# Patient Record
Sex: MALE | Race: WHITE
[De-identification: names, ages, dates, MRNs, and addresses within clinical notes are randomized per-mention and may not be internally consistent; named-entity substitution may affect disease eponyms.]

---

## 2018-09-24 ENCOUNTER — HOSPITAL ENCOUNTER (INPATIENT)
Dept: HOSPITAL 31 - C.ER | Age: 53
LOS: 1 days | Discharge: HOME | DRG: 578 | End: 2018-09-25
Payer: COMMERCIAL

## 2018-09-24 VITALS — RESPIRATION RATE: 20 BRPM

## 2018-09-24 DIAGNOSIS — I83.93: ICD-10-CM

## 2018-09-24 DIAGNOSIS — Z80.42: ICD-10-CM

## 2018-09-24 DIAGNOSIS — D18.09: ICD-10-CM

## 2018-09-24 DIAGNOSIS — D17.79: Primary | ICD-10-CM

## 2018-09-24 LAB
ALBUMIN SERPL-MCNC: 4.7 G/DL (ref 3.5–5)
ALBUMIN/GLOB SERPL: 1.2 {RATIO} (ref 1–2.1)
ALT SERPL-CCNC: 52 U/L (ref 21–72)
APTT BLD: 28 SECONDS (ref 21–34)
AST SERPL-CCNC: 47 U/L (ref 17–59)
BACTERIA #/AREA URNS HPF: (no result) /[HPF]
BASOPHILS # BLD AUTO: 0 K/UL (ref 0–0.2)
BASOPHILS NFR BLD: 0.6 % (ref 0–2)
BILIRUB UR-MCNC: NEGATIVE MG/DL
BUN SERPL-MCNC: 14 MG/DL (ref 9–20)
CALCIUM SERPL-MCNC: 9.1 MG/DL (ref 8.6–10.4)
EOSINOPHIL # BLD AUTO: 0.1 K/UL (ref 0–0.7)
EOSINOPHIL NFR BLD: 2.2 % (ref 0–4)
ERYTHROCYTE [DISTWIDTH] IN BLOOD BY AUTOMATED COUNT: 12.7 % (ref 11.5–14.5)
GFR NON-AFRICAN AMERICAN: > 60
GLUCOSE UR STRIP-MCNC: NORMAL MG/DL
HGB BLD-MCNC: 14.6 G/DL (ref 12–18)
INR PPP: 1.1
LEUKOCYTE ESTERASE UR-ACNC: (no result) LEU/UL
LYMPHOCYTES # BLD AUTO: 1.9 K/UL (ref 1–4.3)
LYMPHOCYTES NFR BLD AUTO: 28.8 % (ref 20–40)
MCH RBC QN AUTO: 30.1 PG (ref 27–31)
MCHC RBC AUTO-ENTMCNC: 34.2 G/DL (ref 33–37)
MCV RBC AUTO: 87.9 FL (ref 80–94)
MONOCYTES # BLD: 0.6 K/UL (ref 0–0.8)
MONOCYTES NFR BLD: 9 % (ref 0–10)
NEUTROPHILS # BLD: 3.9 K/UL (ref 1.8–7)
NEUTROPHILS NFR BLD AUTO: 59.4 % (ref 50–75)
NRBC BLD AUTO-RTO: 0.1 % (ref 0–2)
PH UR STRIP: 5 [PH] (ref 5–8)
PLATELET # BLD: 213 K/UL (ref 130–400)
PMV BLD AUTO: 10.9 FL (ref 7.2–11.7)
PROT UR STRIP-MCNC: NEGATIVE MG/DL
PROTHROMBIN TIME: 12.1 SECONDS (ref 9.7–12.2)
RBC # BLD AUTO: 4.85 MIL/UL (ref 4.4–5.9)
RBC # UR STRIP: NEGATIVE /UL
SP GR UR STRIP: 1.02 (ref 1–1.03)
SQUAMOUS EPITHIAL: 8 /HPF (ref 0–5)
UROBILINOGEN UR-MCNC: NORMAL MG/DL (ref 0.2–1)
WBC # BLD AUTO: 6.5 K/UL (ref 4.8–10.8)

## 2018-09-24 PROCEDURE — 0HXHXZZ TRANSFER RIGHT UPPER LEG SKIN, EXTERNAL APPROACH: ICD-10-PCS

## 2018-09-24 PROCEDURE — 0JBL0ZZ EXCISION OF RIGHT UPPER LEG SUBCUTANEOUS TISSUE AND FASCIA, OPEN APPROACH: ICD-10-PCS

## 2018-09-24 PROCEDURE — 0HXJXZZ TRANSFER LEFT UPPER LEG SKIN, EXTERNAL APPROACH: ICD-10-PCS

## 2018-09-24 PROCEDURE — 0JBM0ZZ EXCISION OF LEFT UPPER LEG SUBCUTANEOUS TISSUE AND FASCIA, OPEN APPROACH: ICD-10-PCS

## 2018-09-24 RX ADMIN — CEFAZOLIN SODIUM SCH MLS/HR: 1 SOLUTION INTRAVENOUS at 21:10

## 2018-09-24 RX ADMIN — CEFAZOLIN SODIUM SCH MLS: 1 SOLUTION INTRAVENOUS at 19:30

## 2018-09-24 NOTE — C.PDOC
History Of Present Illness


54 y/o male presents to ED with c/o pain to veins b/l thighs. PT notes that he 

had this for many years but pain worsened recently. Patient denies trauma, 

discharge, fever, redness, change in sensation, trauma, chest pain, sob,  sym

ptoms or any other complaints at this time. 


Time Seen by Provider: 09/24/18 08:31


Chief Complaint (Nursing): Medical Clearance


History Per: Patient


History/Exam Limitations: no limitations


Onset/Duration Of Symptoms: Days


Current Symptoms Are (Timing): Still Present





Past Medical History


Reviewed: Historical Data, Nursing Documentation, Vital Signs


Vital Signs: 


                                Last Vital Signs











Temp  98.4 F   09/25/18 17:36


 


Pulse  72   09/25/18 17:36


 


Resp  20   09/25/18 17:36


 


BP  128/73   09/25/18 17:36


 


Pulse Ox  99   09/26/18 22:29














- Medical History


PMH: No Chronic Diseases


Other Surgeries: nasal surgery


Family History: States: MI (cabg)





- Social History


Hx Alcohol Use: Yes


Hx Substance Use: No





Review Of Systems


Constitutional: Negative for: Fever, Chills


Musculoskeletal: Positive for: Leg Pain


Skin: Negative for: Rash


Neurological: Negative for: Weakness, Numbness





Physical Exam





- Physical Exam


Appears: Non-toxic, No Acute Distress


Skin: Warm, Dry, No Rash


Head: Atraumatic, Normacephalic


Eye(s): bilateral: Normal Inspection, EOMI


Nose: Normal


Oral Mucosa: Moist


Neck: Normal ROM, Supple


Chest: Symmetrical


Cardiovascular: Rhythm Regular


Respiratory: Normal Breath Sounds, No Rales, No Rhonchi, No Wheezing


Extremity: Tenderness (Mild to left lateral thigh), Capillary Refill (<2 sec), 

No Deformity, Other (Spider veins bilaterally to lateral thighs. No erythema)


Pulses: Left Dorsalis Pedis: Normal, Right Dorsalis Pedis: Normal


Neurological/Psych: Oriented x3, Normal Speech, Normal Motor, Normal Sensation





ED Course And Treatment





- Laboratory Results


Result Diagrams: 


                                 09/25/18 06:14





                                 09/25/18 06:14


O2 Sat by Pulse Oximetry: 99 (RA)


Pulse Ox Interpretation: Normal


Progress Note: Case discussed with dr Judd who instructs admission under his

service.





Disposition





- Disposition


Disposition: HOSPITALIZED


Disposition Time: 17:00


Condition: STABLE





- Clinical Impression


Clinical Impression: 


 Spider veins








- PA / NP / Resident Statement


MD/DO has reviewed & agrees with the documentation as recorded.





- Scribe Statement


The provider has reviewed the documentation as recorded by the Emerita Pool





All medical record entries made by the Emerita were at my direction and 

personally dictated by me. I have reviewed the chart and agree that the record 

accurately reflects my personal performance of the history, physical exam, 

medical decision making, and the department course for this patient. I have also

personally directed, reviewed, and agree with the discharge instructions and 

disposition.

## 2018-09-24 NOTE — CP.PCM.CON
History of Present Illness





- History of Present Illness


History of Present Illness: 





Medicine Consult Note - Dr Hallman





HPI: Patient is a 53 year old male with no significant past medical history who 

presented to the hospital for infected thigh veins bilaterally. Patient went to 

the OR with Dr Judd today for excision of bilateral tumors of hips. Patient 

tolerated the procedure well. He denies any pain at this time. He is ambulating 

and tolerating diet. Patient admitted overnight for IV antibiotics. He denies 

headaches, dizziness, fevers, chills, chest pain, palpitations, sob, abdominal 

pain, urinary symptoms, changes in bowel habits.





PMD: Dr Hallman





Allergies: NKDA


Medications: Denies


Medical History: Denies


Surgical History: Nasal fracture repair years ago


Social History: Drinks alcohol occasionally, denies tobacco or drug use; works 

for [employer]


Family History: Mother - MI, CABG (); Father - Prostate cancer, DM








Past Patient History





- Past Medical History & Family History


Past Medical History?: Yes





- Past Social History


Smoking Status: Never Smoked





- CARDIAC


Hx Cardiac Disorders: No





- PULMONARY


Hx Respiratory Disorders: No





- NEUROLOGICAL


Hx Neurological Disorder: No





- HEENT


Hx HEENT Problems: No





- RENAL


Hx Chronic Kidney Disease: No





- ENDOCRINE/METABOLIC


Hx Endocrine Disorders: No





- HEMATOLOGICAL/ONCOLOGICAL


Hx Blood Disorders: No





- INTEGUMENTARY


Hx Dermatological Problems: No





- MUSCULOSKELETAL/RHEUMATOLOGICAL


Hx Musculoskeletal Disorders: No


Hx Falls: No





- GASTROINTESTINAL


Hx Gastrointestinal Disorders: No





- GENITOURINARY/GYNECOLOGICAL


Hx Genitourinary Disorders: No





- PSYCHIATRIC


Hx Psychophysiologic Disorder: No


Hx Substance Use: No





- SURGICAL HISTORY


Hx Surgeries: Yes


Other/Comment: SCALP CYST REMOVAL, NOSE SX





- ANESTHESIA


Hx Anesthesia: Yes


Hx Anesthesia Reactions: No


Hx Malignant Hyperthermia: No


Has any member of the family had a problem w/ anesthesia?: No





Meds


Allergies/Adverse Reactions: 


 Allergies











Allergy/AdvReac Type Severity Reaction Status Date / Time


 


No Known Allergies Allergy   Verified 18 08:10














- Medications


Medications: 


 Current Medications





Docusate Sodium (Colace)  100 mg PO BID [facility]


Lactated Ringer's (Lactated Ringer's)  1,000 mls @ 150 mls/hr IV .Q6H40M [facility]


Cefazolin Sodium/Dextrose (Ancef Iv 1 Gm Duplex)  1 gm in 50 mls @ 100 mls/hr 

IVPB Q8H [facility]


   PRN Reason: Protocol


   Last Admin: 18 21:10 Dose:  100 mls/hr


Ketorolac Tromethamine (Toradol)  30 mg IVP Q6 PRN


   PRN Reason: pain


   Stop: 18 11:32


Oxycodone/Acetaminophen (Percocet 5/325 Mg Tab)  2 tab PO Q4H PRN


   PRN Reason: pain]


   Stop: 18 11:32


Pantoprazole Sodium (Protonix Inj)  40 mg IVP DAILY [facility]











Physical Exam





- Constitutional


Appears: Well, No Acute Distress





- Head Exam


Head Exam: ATRAUMATIC, NORMAL INSPECTION, NORMOCEPHALIC





- Eye Exam


Eye Exam: EOMI, Normal appearance





- ENT Exam


ENT Exam: Mucous Membranes Moist





- Neck Exam


Neck exam: Positive for: Full Rom





- Respiratory Exam


Respiratory Exam: Clear to Auscultation Bilateral, NORMAL BREATHING PATTERN.  

absent: Rales, Rhonchi, Wheezes





- Cardiovascular Exam


Cardiovascular Exam: REGULAR RHYTHM, +S1, +S2





- GI/Abdominal Exam


GI & Abdominal Exam: Normal Bowel Sounds, Soft.  absent: Guarding, Rebound, 

Rigid, Tenderness





- Extremities Exam


Additional comments: 





Left thigh: Dressing clean/dry/intact


Right thigh: Dressing clean/dry/intact


Moves all extremities, distal pulses intact bilaterally, sensation intact 

bilaterally





- Back Exam


Back exam: NORMAL INSPECTION





- Neurological Exam


Neurological exam: Alert, CN II-XII Intact, Oriented x3





- Psychiatric Exam


Psychiatric exam: Normal Affect, Normal Mood





- Skin


Skin Exam: Dry, Normal Color, Warm





Results





- Vital Signs


Recent Vital Signs: 


 Last Vital Signs











Temp  98.1 F   18 20:15


 


Pulse  70   18 20:15


 


Resp  20   18 20:15


 


BP  131/78   18 20:15


 


Pulse Ox  97   18 20:15














- Labs


Result Diagrams: 


 18 09:11





 18 09:11


Labs: 


 Laboratory Results - last 24 hr











  18





  09:11 09:11 09:11


 


WBC  6.5  


 


RBC  4.85  


 


Hgb  14.6  


 


Hct  42.6  


 


MCV  87.9  


 


MCH  30.1  


 


MCHC  34.2  


 


RDW  12.7  


 


Plt Count  213  


 


MPV  10.9  


 


Neut % (Auto)  59.4  


 


Lymph % (Auto)  28.8  


 


Mono % (Auto)  9.0  


 


Eos % (Auto)  2.2  


 


Baso % (Auto)  0.6  


 


Neut # (Auto)  3.9  


 


Lymph # (Auto)  1.9  


 


Mono # (Auto)  0.6  


 


Eos # (Auto)  0.1  


 


Baso # (Auto)  0.0  


 


PT   12.1 


 


INR   1.1 


 


APTT   28 


 


Sodium    140


 


Potassium    4.6


 


Chloride    104


 


Carbon Dioxide    24


 


Anion Gap    16


 


BUN    14


 


Creatinine    0.8


 


Est GFR ( Amer)    > 60


 


Est GFR (Non-Af Amer)    > 60


 


Random Glucose    137 H


 


Calcium    9.1


 


Total Bilirubin    1.0


 


AST    47


 


ALT    52


 


Alkaline Phosphatase    63


 


Total Protein    8.5 H


 


Albumin    4.7


 


Globulin    4.0 H


 


Albumin/Globulin Ratio    1.2


 


Urine Color   


 


Urine Clarity   


 


Urine pH   


 


Ur Specific Gravity   


 


Urine Protein   


 


Urine Glucose (UA)   


 


Urine Ketones   


 


Urine Blood   


 


Urine Nitrate   


 


Urine Bilirubin   


 


Urine Urobilinogen   


 


Ur Leukocyte Esterase   


 


Urine WBC (Auto)   


 


Urine RBC (Auto)   


 


Ur Squamous Epith Cells   


 


Urine Bacteria   


 


Blood Type   


 


Antibody Screen   














  18





  09:53 09:53


 


WBC  


 


RBC  


 


Hgb  


 


Hct  


 


MCV  


 


MCH  


 


MCHC  


 


RDW  


 


Plt Count  


 


MPV  


 


Neut % (Auto)  


 


Lymph % (Auto)  


 


Mono % (Auto)  


 


Eos % (Auto)  


 


Baso % (Auto)  


 


Neut # (Auto)  


 


Lymph # (Auto)  


 


Mono # (Auto)  


 


Eos # (Auto)  


 


Baso # (Auto)  


 


PT  


 


INR  


 


APTT  


 


Sodium  


 


Potassium  


 


Chloride  


 


Carbon Dioxide  


 


Anion Gap  


 


BUN  


 


Creatinine  


 


Est GFR ( Amer)  


 


Est GFR (Non-Af Amer)  


 


Random Glucose  


 


Calcium  


 


Total Bilirubin  


 


AST  


 


ALT  


 


Alkaline Phosphatase  


 


Total Protein  


 


Albumin  


 


Globulin  


 


Albumin/Globulin Ratio  


 


Urine Color  Yellow 


 


Urine Clarity  Hazy 


 


Urine pH  5.0 


 


Ur Specific Gravity  1.017 


 


Urine Protein  Negative 


 


Urine Glucose (UA)  Normal 


 


Urine Ketones  Negative 


 


Urine Blood  Negative 


 


Urine Nitrate  Negative 


 


Urine Bilirubin  Negative 


 


Urine Urobilinogen  Normal 


 


Ur Leukocyte Esterase  Trace 


 


Urine WBC (Auto)  8 H 


 


Urine RBC (Auto)  1 


 


Ur Squamous Epith Cells  8 H 


 


Urine Bacteria  Rare 


 


Blood Type   A POSITIVE


 


Antibody Screen   Negative














Assessment & Plan





- Assessment and Plan (Free Text)


Assessment: 





A/P: Patient is a 53 year old male with no significant past medical history who 

presented to the hospital for infected bilateral leg veins. Patient is s/p 

excision of bilateral tumors of hips, POD#0. Medicine consult requested for 

medical management.





S/P excision of bilateral tumors of hips, POD#0


-Stable, afebrile


-Admit for observation


-Regular diet


-F/U am labs


-Continue IV Cefazolin 1gm Q8H NURY


-Continue IV fluid hydration


-Pain control as needed


-GI ppx: Protonix 40mg IVP daily


-For possible discharge tomorrow after breakfast





Plan discussed with Dr Lexx Chen DO PGY-2

## 2018-09-24 NOTE — OP
PROCEDURE DATE:  09/24/2018



PREOPERATIVE DIAGNOSIS:  Infected bilateral hip masses.



POSTOPERATIVE DIAGNOSIS:  Infected bilateral hip masses.



PROCEDURE PERFORMED:  Wide deep excision of infected 5 cm bilateral hip

masses with advancement flap closure.



SURGEON:  Geraldo Judd MD



ANESTHESIA:  General.



ESTIMATED BLOOD LOSS:  30 mL.



POSTOPERATIVE CONDITION:  Stable.



INDICATIONS FOR SURGERY:  This is a 53-year-old male admitted to the

emergency room with bilateral infected masses of both hips.  He is admitted

for drainage and wide deep excision.



DESCRIPTION OF PROCEDURE:  The patient was taken to the operating room. 

General anesthesia was administered.  Our attention was first turned to the

left hip, which was prepped and draped.  Transverse elliptical incision was

made surrounding the mass into the fascia.  Bleeding was controlled using a

Bovie.  Larger blood vessel was repaired.  Wound was irrigated with saline.

Full-thickness tissue flaps were raised including a counterincision. 

Advancement flap closures were performed using multiple layers of Monocryl,

subcuticular Monocryl, and skin clips.  The above was basically repeated on

the right side.  The patient tolerated the procedure well, returned to

recovery room in stable condition.





__________________________________________

Geraldo Judd MD





DD:  09/24/2018 11:57:05

DT:  09/24/2018 22:57:02

Job # [id number]

## 2018-09-25 VITALS — TEMPERATURE: 98.4 F | DIASTOLIC BLOOD PRESSURE: 73 MMHG | SYSTOLIC BLOOD PRESSURE: 128 MMHG

## 2018-09-25 VITALS — HEART RATE: 72 BPM

## 2018-09-25 LAB
BASOPHILS # BLD AUTO: 0 K/UL (ref 0–0.2)
BASOPHILS NFR BLD: 0.3 % (ref 0–2)
BUN SERPL-MCNC: 13 MG/DL (ref 9–20)
CALCIUM SERPL-MCNC: 8.7 MG/DL (ref 8.6–10.4)
EOSINOPHIL # BLD AUTO: 0.1 K/UL (ref 0–0.7)
EOSINOPHIL NFR BLD: 1.9 % (ref 0–4)
ERYTHROCYTE [DISTWIDTH] IN BLOOD BY AUTOMATED COUNT: 12.3 % (ref 11.5–14.5)
GFR NON-AFRICAN AMERICAN: > 60
HGB BLD-MCNC: 13 G/DL (ref 12–18)
LYMPHOCYTES # BLD AUTO: 1.7 K/UL (ref 1–4.3)
LYMPHOCYTES NFR BLD AUTO: 26.4 % (ref 20–40)
MCH RBC QN AUTO: 29.8 PG (ref 27–31)
MCHC RBC AUTO-ENTMCNC: 33.9 G/DL (ref 33–37)
MCV RBC AUTO: 88 FL (ref 80–94)
MONOCYTES # BLD: 0.6 K/UL (ref 0–0.8)
MONOCYTES NFR BLD: 9 % (ref 0–10)
NEUTROPHILS # BLD: 3.9 K/UL (ref 1.8–7)
NEUTROPHILS NFR BLD AUTO: 62.4 % (ref 50–75)
NRBC BLD AUTO-RTO: 0 % (ref 0–2)
PLATELET # BLD: 197 K/UL (ref 130–400)
PMV BLD AUTO: 10.3 FL (ref 7.2–11.7)
RBC # BLD AUTO: 4.34 MIL/UL (ref 4.4–5.9)
WBC # BLD AUTO: 6.3 K/UL (ref 4.8–10.8)

## 2018-09-25 RX ADMIN — CEFAZOLIN SODIUM SCH MLS/HR: 1 SOLUTION INTRAVENOUS at 04:42

## 2018-09-25 NOTE — CP.PCM.PN
Subjective





- Date & Time of Evaluation


Date of Evaluation: 09/25/18


Time of Evaluation: 15:31





- Subjective


Subjective: 





Fabiano Shea PGY-1, Medicine progress note for Dr. Hallman





Pt was seen and examined at bedside. Pt is resting comfortably and has no co

mplaints at this time. No acute events overnight. Pt denies fever, chills, 

headache, dizziness, chest pain, SOB, abdominal pain, n/v/d, urinary complaints,

difficulty ambulating.





A 12-point ROS was reviewed and is otherwise unremarkable.





Objective





- Vital Signs/Intake and Output


Vital Signs (last 24 hours): 


                                        











Temp Pulse Resp BP Pulse Ox


 


 98.2 F   72   20   122/64   96 


 


 09/25/18 08:10  09/25/18 08:10  09/25/18 08:10  09/25/18 08:10  09/25/18 08:10








Intake and Output: 


                                        











 09/25/18 09/25/18





 06:59 18:59


 


Intake Total 1600 


 


Balance 1600 














- Medications


Medications: 


                               Current Medications





Docusate Sodium (Colace)  100 mg PO BID [facility]


   Last Admin: 09/25/18 09:48 Dose:  100 mg


Lactated Ringer's (Lactated Ringer's)  1,000 mls @ 150 mls/hr IV .Q6H40M [facility]


   Last Admin: 09/25/18 15:13 Dose:  Not Given


Cefazolin Sodium/Dextrose (Ancef Iv 1 Gm Duplex)  1 gm in 50 mls @ 100 mls/hr 

IVPB Q8H [facility]; Protocol


   Last Admin: 09/25/18 04:42 Dose:  100 mls/hr


Ketorolac Tromethamine (Toradol)  30 mg IVP Q6 PRN


   PRN Reason: pain


   Stop: 09/29/18 11:32


Oxycodone/Acetaminophen (Percocet 5/325 Mg Tab)  2 tab PO Q4H PRN


   PRN Reason: pain]


   Stop: 09/27/18 11:32


Pantoprazole Sodium (Protonix Inj)  40 mg IVP DAILY [facility]


   Last Admin: 09/25/18 09:48 Dose:  40 mg











- Labs


Labs: 


                                        





                                 09/25/18 06:14 





                                 09/25/18 06:14 





                                        











PT  12.1 SECONDS (9.7-12.2)   09/24/18  09:11    


 


INR  1.1   09/24/18  09:11    


 


APTT  28 SECONDS (21-34)   09/24/18  09:11    














- Constitutional


Appears: Well, Non-toxic, No Acute Distress





- Head Exam


Head Exam: ATRAUMATIC, NORMAL INSPECTION





- Eye Exam


Eye Exam: EOMI, Normal appearance





- ENT Exam


ENT Exam: Mucous Membranes Moist





- Neck Exam


Neck Exam: Normal Inspection





- Respiratory Exam


Respiratory Exam: Clear to Ausculation Bilateral, NORMAL BREATHING PATTERN.  

absent: Rhonchi, Wheezes, Respiratory Distress





- Cardiovascular Exam


Cardiovascular Exam: REGULAR RHYTHM, +S1, +S2





- GI/Abdominal Exam


GI & Abdominal Exam: Soft, Normal Bowel Sounds.  absent: Tenderness





- Extremities Exam


Additional comments: 





(+) dressing applied to left lateral thigh; C/D/I, no signs of infection or 

bleeding; nontender


(+) dressing applied to right lateral thigh; C/D/I, no signs of infection or 

bleeding; nontender


FROM of lower extremities, 3+ DPs, sensation intact bilaterally





- Back Exam


Back Exam: NORMAL INSPECTION





- Neurological Exam


Neurological Exam: Alert, Awake, Oriented x3





- Psychiatric Exam


Psychiatric exam: Normal Affect, Normal Mood





- Skin


Skin Exam: Dry, Normal Color, Warm





Assessment and Plan





- Assessment and Plan (Free Text)


Assessment: 





This is a 53 year old male with no significant past medical history who 

presented to the hospital for infected bilateral leg veins, s/p excision of 

bilateral tumors of hips, POD#1. Medicine consult requested for medical georgina olivo.





S/P excision of bilateral tumors of hips, POD#1


- Pt is hemodynamically stable


- No signs of infection, pt nontachycardic and afebrile


- Pt will be discharged with PO medications, and follow up with Dr. Judd in 

the office





Dispo: Pt is medically stable and safe for discharge home as per Dr. Judd's 

instructions. Instructed to follow up with Dr. Judd. Will sign off, and f/u 

with pt in the office as regularly scheduled.





Case was reviewed and discussed with attending physician, Dr. Hallman.


All medical management as per Dr. Hallman.





Fabiano Shea PGY-1

## 2018-09-26 VITALS — OXYGEN SATURATION: 99 %
